# Patient Record
Sex: FEMALE | Race: WHITE | NOT HISPANIC OR LATINO | URBAN - METROPOLITAN AREA
[De-identification: names, ages, dates, MRNs, and addresses within clinical notes are randomized per-mention and may not be internally consistent; named-entity substitution may affect disease eponyms.]

---

## 2017-07-05 ENCOUNTER — IMPORTED ENCOUNTER (OUTPATIENT)
Dept: URBAN - METROPOLITAN AREA CLINIC 9 | Facility: CLINIC | Age: 62
End: 2017-07-05

## 2018-07-09 ENCOUNTER — IMPORTED ENCOUNTER (OUTPATIENT)
Dept: URBAN - METROPOLITAN AREA CLINIC 9 | Facility: CLINIC | Age: 63
End: 2018-07-09

## 2018-08-22 ENCOUNTER — IMPORTED ENCOUNTER (OUTPATIENT)
Dept: URBAN - METROPOLITAN AREA CLINIC 9 | Facility: CLINIC | Age: 63
End: 2018-08-22

## 2018-10-26 ENCOUNTER — IMPORTED ENCOUNTER (OUTPATIENT)
Dept: URBAN - METROPOLITAN AREA CLINIC 9 | Facility: CLINIC | Age: 63
End: 2018-10-26

## 2019-05-01 ENCOUNTER — IMPORTED ENCOUNTER (OUTPATIENT)
Dept: URBAN - METROPOLITAN AREA CLINIC 9 | Facility: CLINIC | Age: 64
End: 2019-05-01

## 2019-07-11 ENCOUNTER — IMPORTED ENCOUNTER (OUTPATIENT)
Dept: URBAN - METROPOLITAN AREA CLINIC 9 | Facility: CLINIC | Age: 64
End: 2019-07-11

## 2020-06-17 ENCOUNTER — IMPORTED ENCOUNTER (OUTPATIENT)
Dept: URBAN - METROPOLITAN AREA CLINIC 9 | Facility: CLINIC | Age: 65
End: 2020-06-17

## 2020-08-13 ENCOUNTER — IMPORTED ENCOUNTER (OUTPATIENT)
Dept: URBAN - METROPOLITAN AREA CLINIC 9 | Facility: CLINIC | Age: 65
End: 2020-08-13

## 2020-09-29 ENCOUNTER — IMPORTED ENCOUNTER (OUTPATIENT)
Dept: URBAN - METROPOLITAN AREA CLINIC 9 | Facility: CLINIC | Age: 65
End: 2020-09-29

## 2020-09-29 PROBLEM — H40.013: Noted: 2020-09-29

## 2021-10-16 ASSESSMENT — VISUAL ACUITY
OS_CC: 20/30 + SN
OD_SC: 20/60 -2 SN
OS_CC: 20/20 -2 SN
OD_CC: 20/20 SN
OD_CC: 20/30 SN
OS_CC: 20/30 + SN
OD_SC: 20/70 - SN
OD_CC: 20/20 SN
OD_CC: 20/25 -2 SN
OS_CC: 20/30 - SN
OS_CC: 20/30 +2 SN
OS_CC: 20/30 -2 SN
OD_CC: 20/25 -2 SN
OS_CC: 20/20 -2 SN
OD_CC: 20/30 + SN
OD_SC: 20/40 -2 SN
OD_CC: 20/25 - SN
OD_CC: 20/40 SN
OS_SC: 20/50 - SN
OD_CC: 20/30 + SN
OS_CC: 20/30 SN
OS_CC: 20/20 - SN
OD_SC: 20/40 -2 SN
OS_SC: 20/70 - SN
OS_CC: 20/25 + SN
OD_CC: 20/20 SN
OS_SC: 20/20 - SN
OS_SC: 20/50 -2 SN

## 2021-10-16 ASSESSMENT — KERATOMETRY
OD_AXISANGLE2_DEGREES: 36
OS_AXISANGLE2_DEGREES: 104
OS_AXISANGLE_DEGREES: 7
OD_AXISANGLE_DEGREES: 137
OD_K1POWER_DIOPTERS: 43.5
OD_K1POWER_DIOPTERS: 43.5
OS_K1POWER_DIOPTERS: 43.5
OD_AXISANGLE2_DEGREES: 47
OS_AXISANGLE_DEGREES: 25
OS_K2POWER_DIOPTERS: 43.75
OD_AXISANGLE_DEGREES: 126
OD_AXISANGLE_DEGREES: 127
OS_AXISANGLE_DEGREES: 14
OD_K1POWER_DIOPTERS: 43.5
OS_K1POWER_DIOPTERS: 43.25
OD_K2POWER_DIOPTERS: 44.25
OD_AXISANGLE2_DEGREES: 37
OS_AXISANGLE2_DEGREES: 97
OD_K2POWER_DIOPTERS: 44.25
OS_AXISANGLE_DEGREES: 14
OD_AXISANGLE_DEGREES: 140
OS_K2POWER_DIOPTERS: 44.25
OD_K2POWER_DIOPTERS: 44.25
OD_K2POWER_DIOPTERS: 44.25
OD_K1POWER_DIOPTERS: 43.75
OS_AXISANGLE2_DEGREES: 115
OS_K2POWER_DIOPTERS: 44
OD_AXISANGLE2_DEGREES: 50
OS_K1POWER_DIOPTERS: 43.5
OS_K2POWER_DIOPTERS: 43.75
OS_K1POWER_DIOPTERS: 43.25
OS_AXISANGLE2_DEGREES: 104

## 2021-10-16 ASSESSMENT — TONOMETRY
OS_IOP_MMHG: 17
OS_IOP_MMHG: 13
OD_IOP_MMHG: 20
OS_IOP_MMHG: 18
OS_IOP_MMHG: 19
OD_IOP_MMHG: 18
OS_IOP_MMHG: 17
OS_IOP_MMHG: 15
OD_IOP_MMHG: 21
OD_IOP_MMHG: 17
OS_IOP_MMHG: 18
OD_IOP_MMHG: 17

## 2021-10-16 ASSESSMENT — PACHYMETRY
OS_CT_UM: 590.0
OD_CT_UM: 591.0

## 2022-02-23 ENCOUNTER — ESTABLISHED PATIENT (OUTPATIENT)
Dept: URBAN - METROPOLITAN AREA CLINIC 9 | Facility: CLINIC | Age: 67
End: 2022-02-23

## 2022-02-23 DIAGNOSIS — H25.13: ICD-10-CM

## 2022-02-23 DIAGNOSIS — D31.31: ICD-10-CM

## 2022-02-23 DIAGNOSIS — H52.13: ICD-10-CM

## 2022-02-23 DIAGNOSIS — H35.413: ICD-10-CM

## 2022-02-23 DIAGNOSIS — H40.013: ICD-10-CM

## 2022-02-23 PROCEDURE — 92015 DETERMINE REFRACTIVE STATE: CPT

## 2022-02-23 PROCEDURE — 92134 CPTRZ OPH DX IMG PST SGM RTA: CPT

## 2022-02-23 PROCEDURE — 92014 COMPRE OPH EXAM EST PT 1/>: CPT

## 2022-02-23 PROCEDURE — 92133 CPTRZD OPH DX IMG PST SGM ON: CPT

## 2022-02-23 ASSESSMENT — VISUAL ACUITY
OS_CC: 20/20
OS_GLARE: 20/30
OD_SC: 20/50-2
OU_SC: 20/40-1
OU_CC: 20/20
OD_CC: 20/20
OD_GLARE: 20/25
OS_SC: 20/40

## 2022-02-23 ASSESSMENT — KERATOMETRY
OD_AXISANGLE2_DEGREES: 24
OS_AXISANGLE_DEGREES: 13
OD_K2POWER_DIOPTERS: 44.50
OS_K1POWER_DIOPTERS: 43.75
OS_AXISANGLE2_DEGREES: 103
OS_K2POWER_DIOPTERS: 44.00
OD_AXISANGLE_DEGREES: 114
OD_K1POWER_DIOPTERS: 44.00

## 2022-02-23 ASSESSMENT — TONOMETRY
OD_IOP_MMHG: 18
OS_IOP_MMHG: 16

## 2022-11-16 ENCOUNTER — ESTABLISHED PATIENT (OUTPATIENT)
Dept: URBAN - METROPOLITAN AREA CLINIC 9 | Facility: CLINIC | Age: 67
End: 2022-11-16

## 2022-11-16 DIAGNOSIS — H35.413: ICD-10-CM

## 2022-11-16 DIAGNOSIS — H43.813: ICD-10-CM

## 2022-11-16 DIAGNOSIS — D31.31: ICD-10-CM

## 2022-11-16 PROCEDURE — 92134 CPTRZ OPH DX IMG PST SGM RTA: CPT

## 2022-11-16 PROCEDURE — 92014 COMPRE OPH EXAM EST PT 1/>: CPT

## 2022-11-16 ASSESSMENT — TONOMETRY
OS_IOP_MMHG: 18
OD_IOP_MMHG: 19

## 2022-11-16 ASSESSMENT — VISUAL ACUITY
OD_CC: 20/30-1
OS_CC: 20/30-1

## 2023-11-15 ENCOUNTER — ESTABLISHED PATIENT (OUTPATIENT)
Facility: LOCATION | Age: 68
End: 2023-11-15

## 2023-11-15 DIAGNOSIS — H43.813: ICD-10-CM

## 2023-11-15 DIAGNOSIS — H35.413: ICD-10-CM

## 2023-11-15 DIAGNOSIS — D31.31: ICD-10-CM

## 2023-11-15 DIAGNOSIS — H25.13: ICD-10-CM

## 2023-11-15 PROCEDURE — 99214 OFFICE O/P EST MOD 30 MIN: CPT

## 2023-11-15 PROCEDURE — 92250 FUNDUS PHOTOGRAPHY W/I&R: CPT

## 2023-11-15 ASSESSMENT — TONOMETRY
OD_IOP_MMHG: 13
OS_IOP_MMHG: 19

## 2023-11-15 ASSESSMENT — VISUAL ACUITY
OD_CC: 20/30+1
OS_CC: 20/30-2

## 2023-11-27 ENCOUNTER — ESTABLISHED PATIENT (OUTPATIENT)
Facility: LOCATION | Age: 68
End: 2023-11-27

## 2023-11-27 DIAGNOSIS — H04.123: ICD-10-CM

## 2023-11-27 DIAGNOSIS — G43.B0: ICD-10-CM

## 2023-11-27 DIAGNOSIS — H52.13: ICD-10-CM

## 2023-11-27 DIAGNOSIS — D31.31: ICD-10-CM

## 2023-11-27 DIAGNOSIS — H35.413: ICD-10-CM

## 2023-11-27 DIAGNOSIS — H25.13: ICD-10-CM

## 2023-11-27 DIAGNOSIS — H40.013: ICD-10-CM

## 2023-11-27 PROCEDURE — 92014 COMPRE OPH EXAM EST PT 1/>: CPT

## 2023-11-27 PROCEDURE — 92133 CPTRZD OPH DX IMG PST SGM ON: CPT

## 2023-11-27 PROCEDURE — 92015 DETERMINE REFRACTIVE STATE: CPT

## 2023-11-27 ASSESSMENT — KERATOMETRY
OD_AXISANGLE_DEGREES: 121
OS_AXISANGLE2_DEGREES: 123
OS_AXISANGLE_DEGREES: 33
OD_AXISANGLE2_DEGREES: 31
OD_K1POWER_DIOPTERS: 43.25
OS_K1POWER_DIOPTERS: 43.50
OD_K2POWER_DIOPTERS: 44.00
OS_K2POWER_DIOPTERS: 43.75

## 2023-11-27 ASSESSMENT — VISUAL ACUITY
OS_CC: 20/30-2
OD_CC: 20/50-1
OU_CC: 20/30-1

## 2023-11-27 ASSESSMENT — TONOMETRY
OD_IOP_MMHG: 16
OS_IOP_MMHG: 18

## 2024-11-26 ENCOUNTER — COMPREHENSIVE EXAM (OUTPATIENT)
Facility: LOCATION | Age: 69
End: 2024-11-26

## 2024-11-26 DIAGNOSIS — G43.B0: ICD-10-CM

## 2024-11-26 DIAGNOSIS — D31.31: ICD-10-CM

## 2024-11-26 DIAGNOSIS — H35.413: ICD-10-CM

## 2024-11-26 DIAGNOSIS — H43.813: ICD-10-CM

## 2024-11-26 DIAGNOSIS — H40.013: ICD-10-CM

## 2024-11-26 DIAGNOSIS — H04.123: ICD-10-CM

## 2024-11-26 DIAGNOSIS — H52.13: ICD-10-CM

## 2024-11-26 DIAGNOSIS — H11.153: ICD-10-CM

## 2024-11-26 DIAGNOSIS — H25.13: ICD-10-CM

## 2024-11-26 PROCEDURE — 92133 CPTRZD OPH DX IMG PST SGM ON: CPT

## 2024-11-26 PROCEDURE — 92015 DETERMINE REFRACTIVE STATE: CPT

## 2024-11-26 PROCEDURE — 92014 COMPRE OPH EXAM EST PT 1/>: CPT

## 2025-03-19 ENCOUNTER — COMPREHENSIVE EXAM (OUTPATIENT)
Age: 70
End: 2025-03-19

## 2025-03-19 DIAGNOSIS — D31.31: ICD-10-CM

## 2025-03-19 DIAGNOSIS — H25.13: ICD-10-CM

## 2025-03-19 DIAGNOSIS — H43.813: ICD-10-CM

## 2025-03-19 DIAGNOSIS — H35.413: ICD-10-CM

## 2025-03-19 PROCEDURE — 92134 CPTRZ OPH DX IMG PST SGM RTA: CPT

## 2025-03-19 PROCEDURE — 99214 OFFICE O/P EST MOD 30 MIN: CPT

## 2025-03-19 PROCEDURE — 92201 OPSCPY EXTND RTA DRAW UNI/BI: CPT
